# Patient Record
Sex: MALE | Race: WHITE
[De-identification: names, ages, dates, MRNs, and addresses within clinical notes are randomized per-mention and may not be internally consistent; named-entity substitution may affect disease eponyms.]

---

## 2020-02-14 LAB
ANION GAP SERPL CALC-SCNC: 13.2 MMOL/L (ref 8–16)
BASOPHILS # BLD AUTO: 0 10*3/UL (ref 0–0.1)
BASOPHILS NFR BLD AUTO: 0.3 % (ref 0–1)
BUN SERPL-MCNC: 20 MG/DL (ref 7–26)
BUN/CREAT SERPL: 13 (ref 6–25)
CALCIUM SERPL-MCNC: 8.8 MG/DL (ref 8.4–10.2)
CHLORIDE SERPL-SCNC: 104 MMOL/L (ref 98–107)
CO2 SERPL-SCNC: 23 MMOL/L (ref 22–29)
DEPRECATED NEUTROPHILS # BLD AUTO: 4.2 10*3/UL (ref 2.1–6.9)
EGFRCR SERPLBLD CKD-EPI 2021: 43 ML/MIN (ref 60–?)
EOSINOPHIL # BLD AUTO: 0.1 10*3/UL (ref 0–0.4)
EOSINOPHIL NFR BLD AUTO: 1.5 % (ref 0–6)
ERYTHROCYTE [DISTWIDTH] IN CORD BLOOD: 13.2 % (ref 11.7–14.4)
GLUCOSE SERPLBLD-MCNC: 236 MG/DL (ref 74–118)
HCT VFR BLD AUTO: 42.7 % (ref 38.2–49.6)
HGB BLD-MCNC: 13.5 G/DL (ref 14–18)
LYMPHOCYTES # BLD: 2.2 10*3/UL (ref 1–3.2)
LYMPHOCYTES NFR BLD AUTO: 31 % (ref 18–39.1)
MCH RBC QN AUTO: 29.9 PG (ref 28–32)
MCHC RBC AUTO-ENTMCNC: 31.6 G/DL (ref 31–35)
MCV RBC AUTO: 94.7 FL (ref 81–99)
MONOCYTES # BLD AUTO: 0.6 10*3/UL (ref 0.2–0.8)
MONOCYTES NFR BLD AUTO: 8.6 % (ref 4.4–11.3)
NEUTS SEG NFR BLD AUTO: 58.5 % (ref 38.7–80)
PLATELET # BLD AUTO: 307 X10E3/UL (ref 140–360)
POTASSIUM SERPL-SCNC: 4.2 MMOL/L (ref 3.5–5.1)
RBC # BLD AUTO: 4.51 X10E6/UL (ref 4.3–5.7)
SODIUM SERPL-SCNC: 136 MMOL/L (ref 136–145)

## 2020-02-18 ENCOUNTER — HOSPITAL ENCOUNTER (OUTPATIENT)
Dept: HOSPITAL 88 - OR | Age: 75
Discharge: HOME | End: 2020-02-18
Attending: INTERNAL MEDICINE
Payer: MEDICARE

## 2020-02-18 VITALS — SYSTOLIC BLOOD PRESSURE: 102 MMHG | DIASTOLIC BLOOD PRESSURE: 55 MMHG

## 2020-02-18 DIAGNOSIS — K29.50: ICD-10-CM

## 2020-02-18 DIAGNOSIS — Z79.82: ICD-10-CM

## 2020-02-18 DIAGNOSIS — Z01.812: ICD-10-CM

## 2020-02-18 DIAGNOSIS — D12.0: ICD-10-CM

## 2020-02-18 DIAGNOSIS — I25.810: ICD-10-CM

## 2020-02-18 DIAGNOSIS — K64.8: ICD-10-CM

## 2020-02-18 DIAGNOSIS — K31.7: ICD-10-CM

## 2020-02-18 DIAGNOSIS — K44.9: ICD-10-CM

## 2020-02-18 DIAGNOSIS — Z01.810: ICD-10-CM

## 2020-02-18 DIAGNOSIS — Z79.02: ICD-10-CM

## 2020-02-18 DIAGNOSIS — E78.5: ICD-10-CM

## 2020-02-18 DIAGNOSIS — K31.89: ICD-10-CM

## 2020-02-18 DIAGNOSIS — Z79.84: ICD-10-CM

## 2020-02-18 DIAGNOSIS — E11.9: ICD-10-CM

## 2020-02-18 DIAGNOSIS — Z95.5: ICD-10-CM

## 2020-02-18 DIAGNOSIS — Z79.4: ICD-10-CM

## 2020-02-18 DIAGNOSIS — K52.9: Primary | ICD-10-CM

## 2020-02-18 DIAGNOSIS — Z95.1: ICD-10-CM

## 2020-02-18 DIAGNOSIS — I10: ICD-10-CM

## 2020-02-18 PROCEDURE — 80048 BASIC METABOLIC PNL TOTAL CA: CPT

## 2020-02-18 PROCEDURE — 85025 COMPLETE CBC W/AUTO DIFF WBC: CPT

## 2020-02-18 PROCEDURE — 45384 COLONOSCOPY W/LESION REMOVAL: CPT

## 2020-02-18 PROCEDURE — 93005 ELECTROCARDIOGRAM TRACING: CPT

## 2020-02-18 PROCEDURE — 43239 EGD BIOPSY SINGLE/MULTIPLE: CPT

## 2020-02-18 PROCEDURE — 45378 DIAGNOSTIC COLONOSCOPY: CPT

## 2020-02-18 PROCEDURE — 45380 COLONOSCOPY AND BIOPSY: CPT

## 2020-02-18 PROCEDURE — 36415 COLL VENOUS BLD VENIPUNCTURE: CPT

## 2020-02-18 NOTE — XMS REPORT
Encounter Summary

                             Created on: 2019



Prince Shelley

External Reference #: [id number]

: 1945

Sex: Male



Demographics







                          Address                   [address] [place] [address]  [address]

 

                          Home Phone                [phone]

 

                          Preferred Language        English

 

                          Marital Status            Never 

 

                          Rastafari Affiliation     Unknown

 

                          Race                      White

 

                          Ethnic Group              Non-





Author







                          Organization              Unknown

 

                          Address                   [address] [place] [address]  [address]



 

                          Phone                     [phone]







Care Team Providers







                    Care Team Member Name    Role                Phone

 

                    Dr. Mitchell Rangel    [phone]                   [phone]



                                                      



Reason for Visit

                      





                                        hyperlipidemia; Annual Depression Screening; AWV Annual Wellness Visit Male (VFP);

 hypertension; diabetes                



                                                                              



Instructions

          





                                        1. Adult health examination

 

                                        2. Depression screening

 

                                         learning about depression

 

                                        3. Obesity

 

                                         learning about healthy weight

 

                                        4. Hyperlipidemia

 

                                         high cholesterol: care instructions

 

                                         CMP, serum or plasma

 

                                         lipid panel, serum

 

                                        5. Hypertensive disorder

 

                                         CBC w/ auto diff

 

                                        6. Screening for malignant neoplasm of prostate

 

                                         PSA, serum or plasma

 

                                        7. Screening for disorder

 

                                         hepatitis C virus RNA, quant, PCR, serum or plasma

 

                                        8. Senile purpura

 

                                        9. Angina pectoris

 

                                        10. Congestive heart failure

 

                                        11. Stented coronary artery

 

                                        12. Type 2 diabetes mellitus

 

                                         HbA1c (hemoglobin A1c), blood

 

                                         microalbumin:creatinine ratio, urine







Discussion Note: None recorded.                                                 
                                                          



Plan of Care

                      





                Reminders                                                                    Provider 

               

 

                Appointments    Est Patient     2019 8:15AM    Mitchell Browning MD

 

                Lab             CMP, Serum or Plasma    2019      [facility]

 

                               Lipid Panel, Serum    2019      [facility]

 

                               HbA1C (Hemoglobin a1C), Blood    2019      [facility]



 

                               Microalbumin:creatinine Ratio, Urine    2019      [facility]



 

                               PSA, Serum or Plasma    2019      [facility]

 

                               CBC W/ Auto Diff    2019      [facility]

 

                               Hepatitis C Virus RNA, Quant, PCR, Serum or Plasma    2019      [facility]

 

                Referral        None recorded.                   

 

                Procedures      None recorded.                   

 

                Surgeries       None recorded.                   

 

                Imaging         None recorded.                   



                                                                                
                                                                             



Medications

                      





                Name                      Start Date                                                

            

 

                                        aspirin 81 mg tablet,delayed release

 Take 1 tablet every day by oral route.    

 

                                        atorvastatin 80 mg tablet

 Take 1 tablet every day by oral route.    

 

                                        BD Ultra-Fine Short Pen Needle 31 gauge x 5/16"

 use once daily                         

 

                                        carvedilol 12.5 mg tablet

 Take 1 tablet twice a day by oral route.    

 

                                        clopidogrel 75 mg tablet

 Take 1 tablet every day by oral route.    

 

                                        doxycycline hyclate 50 mg capsule

 Take 1 capsule twice a day by oral route.    

 

                                        fenofibrate nanocrystallized 145 mg tablet

 Take 1 tablet every day by oral route.    

 

                                        furosemide 20 mg tablet

 Take 1 tablet every day by oral route.    

 

                                        glipizide 10 mg tablet

 Take 1 tablet twice a day by oral route.    

 

                                        ibuprofen 600 mg tablet

 Take 1 tablet 3 times a day by oral route as needed.    

 

                                        isosorbide mononitrate ER 30 mg tablet,extended release 24 hr

 Take 1 tablet every day by oral route.    

 

                                        Levemir FlexTouch U-100 Insulin 100 unit/mL (3 mL) subcutaneous pen

 Inject 35 units every day by sub-q route at bedtime.    

 

                                        lisinopril 20 mg tablet

 Take 1 tablet twice a day by oral route.    

 

                                        metformin 500 mg tablet

 Take 2 tablets twice a day by oral route.    

 

                                        nitroglycerin 0.4 mg sublingual tablet

 PLACE 1 TABLET (0.4 MG) BY SUBLINGUAL ROUTE EVERY 5 MINUTES AS NEEDEDFOR CHEST 
PAIN. DO NOT EXCEED 3 DOSES IN 15 MINUTES.    

 

                                        OneTouch Ultra Blue Test Strip

 use three times daily                  

 

                                        OneTouch Ultra2 Meter

 use as directed                        



                                                                                
                                                                                
                                                                                
     



Medications Administered

          



  None recorded.                                                                
               



Vitals

          





                Height          Weight          BMI             Blood Pressure 

 

                 5 ft 8 in        206.6 lbs        31.4 kg/m2        124/70 mm[Hg]  



                                                                              



Lab Results

                      



              None recorded.                                                    
                                                



Allergies

          





          Code      Code System    Name      Reaction    Severity    Status    Onset

 

                              Penicillins    Other     Moderate    Active    

 

          013559    RxNorm    Vicodin    Other     Moderate    Active    



                                                                                
        



Problems

                      





                Name                      Status                      Onset Date                      

Source                                                  

 

                Myocardial Infarction    Active          2019      

 

                Congestive Heart Failure    Active          2019      

 

                Stented Coronary Artery    Active          2019      

 

                Coronary Artery Bypass Graft    Active          2019      

 

                Type 2 Diabetes Mellitus    Active                         

 

                Hyperlipidemia    Active                         

 

                Hypertensive Disorder    Active                         



                                                                                
                                                                    



Procedures

                      





                Date                      Name                      Performed by                      

                

 

                    2011          Cardiac Surgery     Information not available



                                                                                
        



Vaccine List

          



None recorded.                                                                  
            



Social History

          





                    Tobacco Smoking Status    Never Smoker         



                                                                              



Past Encounters

                      





                                        2019

Adult Health Examination; Depression Screening; Obesity; Hyperlipidemia; 
Hypertensive Disorder; Screening for Malignant Neoplasm of Prostate; Screening 
for Disorder; Senile Purpura; Angina Pectoris; Congestive Heart Failure; Stented
 Coronary Artery; Type 2 Diabetes Mellitus

Mitchell Browning MD: [address] [place] [address], Ph. 
[phone]



                                                                                
        



History of Present Illness

          





                                                   Mini Cog

 

                          Reported By:              Patient

 

                          Functional Ability:       Personal/Social/ Draw a clock and write in the numbers in the

 correct place, and set the time to 10 minutes after 11 o'clock was completed 
correctly? Yes, 3 word recall: Your nurse or doctor will ask you to remember 3 
words.  In 5 minutes, they will ask you to repeat them.   Patient recalled 3 
words









                                                   Opioid Use Assessment

 

                          Reported By:              Patient

 

                          Opioid Use Assessment::    Current Use of Opioids : no use of opioids (no further 

questions required)







Note:F/u on dm: glucose readings at home 60s-90s fasting.<div>F/u on HTN: BPs at
 home: 140s/80s.</div><div>F/u on CHF, CAD s/p stent, angina:  SOB with 
activity. Denies LE edema, chest pain, palpitations or lightheadedness.</div>   
                                                                 



Review of Systems

                      





                                                   Comprehensive General Adult ROS

 

                          Reported By:              Patient

 

                          Constitutional:           Constitutional: no fever

 

                          Eyes:                     Eyes: no vision change

 

                          Cardiovascular:           Cardiovascular: no chest pain, no palpitations, no lightheadedness



 

                          Respiratory:              Respiratory: no cough, no wheezing, shortness of breath

 

                          Gastrointestinal:         Gastrointestinal: no abdominal pain, no nausea, no vomiting, 

no constipation, no diarrhea

 

                          Musculoskeletal:          Musculoskeletal: no muscle aches, no swelling in the extremities



 

                          Neurologic:               Neurologic: no loss of consciousness, no headaches

 

                          Psychiatric:              Psych: no depression, no alcohol abuse, no anxiety, no suicidal thoughts



 

                          Endocrine:                Endocrine: no fatigue



                                                                              



Physical Exam

                      





                                                   General Adult Exam (male)

 

                          Reported By:              Patient

 

                          Constitutional:           General Appearance: healthy-appearing, obese. Level of Distress:

 NAD. Ambulation: ambulating normally

 

                          Psychiatric:              Insight: good judgement. Mental Status: active and alert, normal mood,

 normal affect. Orientation: to time, to place, to person. Memory: recent memory
 normal, remote memory normal

 

                          Eyes:                     Lids and Conjunctivae: non-injected, no discharge

 

                          ENMT:                     Ears: TMs clear. Nose: no sinus tenderness. Lips, Teeth, and Gums: no mouth

 or lip ulcers. Oropharynx: moist mucous membranes

 

                          Neck:                     Neck: supple, trachea midline. Thyroid: no enlargement, non-tender

 

                          Lungs:                    Auscultation: breath sounds normal

 

                          Cardiovascular:           Heart Auscultation: RRR, normal S1, normal S2, no murmurs. Neck

 vessels: no carotid bruits

 

                          Abdomen:                  Inspection and Palpation: soft, non-distended, no tenderness, no guarding



 

                          Musculoskeletal::         Motor Strength and Tone: normal, normal tone. Joints, Bones, 

and Muscles: normal movement of all extremities. Extremities: no edema

 

                          Neurologic:               Gait and Station: normal gait

 

                          Skin:                     Inspection and palpation: no rash, no lesions

## 2020-02-18 NOTE — XMS REPORT
Patient Summary Document

                             Created on: 2020



ANUEL JEAN BAPTISTE

External Reference #: [id number]

: 1945

Sex: Male



Demographics







                          Address                   [address]

[place]  [address]

 

                          Home Phone                [phone]

 

                          Preferred Language        Unknown

 

                          Marital Status            Unknown

 

                          Restoration Affiliation     Unknown

 

                          Race                      Unknown

 

                          Ethnic Group              Unknown





Author







                          Author                    [facility]

 

                          Address                   Unknown

 

                          Phone                     Unavailable







Care Team Providers







                    Care Team Member Name    Role                Phone

 

                          Unavailable               Unavailable







Problems

This patient has no known problems.



Allergies, Adverse Reactions, Alerts

This patient has no known allergies or adverse reactions.



Medications

This patient has no known medications.



Encounters







             Start Date/Time    End Date/Time    Encounter Type    Admission Type    Attending Clinicians

                    Care Facility       Care Department     Encounter ID

 

        2020 08:35:00    2020 08:35:00    Outpatient                    [facility]    MED     [id number]

## 2020-02-18 NOTE — XMS REPORT
Encounter Summary

                             Created on: 2019



Prince Shelley

External Reference #: [id number]

: 1945

Sex: Male



Demographics







                          Address                   [address]

[place]  [address]

 

                          Home Phone                [phone]

 

                          Preferred Language        English

 

                          Marital Status            Never 

 

                          Religion Affiliation     Unknown

 

                          Race                      White

 

                          Ethnic Group              Non-





Author







                          Organization              Unknown

 

                          Address                   [address] [place]  [address]



 

                          Phone                     [phone]







Care Team Providers







                    Care Team Member Name    Role                Phone

 

                    Dr. Mitchell Rangel    [phone]                   [phone]

 

                    Rony Umana MD    210                 [phone]



                                                      



Reason for Visit

          





                                        Congestive heart failure; Hyperlipidemia; Angina pectoris; Hypertensive disorder;

 Type 2 diabetes mellitus; Stented coronary artery



                                                                    



Instructions

          





                                        1. Hypertensive disorder

 

                                        2. Hyperlipidemia

 

                                         high cholesterol: care instructions

 

                                        3. Type 2 diabetes mellitus

 

                                        4. Angina pectoris

 

                                        5. Congestive heart failure

 

                                        6. Stented coronary artery

 

                                        7. Screening for malignant neoplasm of colon

 

                                         fecal occult blood, stool

 

                                        8. Peripheral vascular disease

 

                                        9. Allergic rhinitis

 

                                         fluticasone propionate 50 mcg/actuation nasal spray,suspension

 

                                         montelukast 10 mg tablet







Discussion Note: None recorded.                                                 
                                      



Plan of Care

                      





                Reminders                                                                    Provider 

               

 

                Appointments    Return to Office    on or around 02/15/2020    Mitchell Browning MD

 

                Lab             Fecal Occult Blood, Stool    11/15/2019      [facility]

 

                Referral        None recorded.                   

 

                Procedures      None recorded.                   

 

                Surgeries       None recorded.                   

 

                Imaging         None recorded.                   



                                                                                
                 



Medications

                      





                Name                      Start Date                                                

            

 

                                        aspirin 81 mg tablet,delayed release

 Take 1 tablet every day by oral route.    

 

                                        atorvastatin 80 mg tablet

 Take 1 tablet every day by oral route.    

 

                                        BD Ultra-Fine Short Pen Needle 31 gauge x 5/16"

 use once daily                         

 

                                        carvedilol 12.5 mg tablet

 Take 1 tablet twice a day by oral route.    

 

                                        clopidogrel 75 mg tablet

 Take 1 tablet every day by oral route.    

 

                                        doxycycline hyclate 50 mg capsule

 Take 1 capsule twice a day by oral route.    

 

                                        fenofibrate nanocrystallized 145 mg tablet

 Take 1 tablet every day by oral route.    

 

                                        fluticasone propionate 50 mcg/actuation nasal spray,suspension

 Spray 1 spray twice a day by intranasal route as directed for 30 days.    

 

                                        furosemide 20 mg tablet

 Take 1 tablet every day by oral route.    

 

                                        glipizide 10 mg tablet

 Take 1 tablet twice a day by oral route.    

 

                                        iron

 3 TIMES A DAY - OTC                    

 

                                        isosorbide mononitrate ER 30 mg tablet,extended release 24 hr

 Take 1 tablet every day by oral route.    

 

                                        Levemir FlexTouch U-100 Insulin 100 unit/mL (3 mL) subcutaneous pen

 Inject 35 units every day by sub-q route at bedtime.    

 

                                        lisinopril 20 mg tablet

 Take 1 tablet twice a day by oral route for 90 days.    

 

                                        metformin 500 mg tablet

 Take 2 tablets twice a day by oral route.    

 

                                        montelukast 10 mg tablet

 TAKE 1 TABLET BY MOUTH EVERYDAY AT BEDTIME    

 

                                        nitroglycerin 0.4 mg sublingual tablet

 DISSOLVE 1 TABLET ON TONGUE AND SWALLOW EVERY 5 MINUTES AS NEEDED FOR CHEST 
PAIN                                    

 

                                        OneTouch Ultra Blue Test Strip

 Take 1 strip twice a day by miscell. route.    



                                                                                
                                                                                
                                                                                
               



Medications Administered

          



  None recorded.                                                                
               



Vitals

          





                Height          Weight          BMI             Blood Pressure 

 

                 5 ft 8 in        209 lbs         31.8 kg/m2        120/60 mm[Hg]  



                                                                              



Results

                          





                                        Lab Results

 

                                         



                



None recorded.                                                                  
            



Allergies

          





          Code      Code System    Name      Reaction    Severity    Status    Onset

 

                              Penicillins    Other     Moderate    Active    

 

          601211    RxNorm    Vicodin    Other     Moderate    Active    



                                                                                
        



Problems

                      





                Name                      Status                      Onset Date                      

Source                                                  

 

                Myocardial Infarction    Active          2019      

 

                Congestive Heart Failure    Active          2019      

 

                Stented Coronary Artery    Active          2019      

 

                Coronary Artery Bypass Graft    Active          2019      

 

                Senile Purpura    Active          2019      

 

                Angina Pectoris    Active          2019      

 

                Type 2 Diabetes Mellitus    Active                         

 

                Hyperlipidemia    Active                         

 

                Hypertensive Disorder    Active                         



                                                                                
                                                                                
        



Procedures

                      





                Date                      Name                      Performed by                      

                

 

                    2011          Cardiac Surgery     Information not available



                                                                                
        



Vaccine List

          





                                        Vaccine Type

 

                                        influenza, injectable, quadrivalent

 

                                        2019

 

                                        pneumococcal conjugate PCV 13

 

                                        2019

 

                                        pneumococcal polysaccharide PPV23

 

                                        2013

 

                                        Tdap

 

                                        2017



                                                                                
                            



Social History

          





                    Tobacco Smoking Status    Never Smoker         



                                                                              



Past Encounters

                      





                                        11/15/2019

Hypertensive Disorder; Hyperlipidemia; Type 2 Diabetes Mellitus; Angina 
Pectoris; Congestive Heart Failure; Stented Coronary Artery; Screening for 
Malignant Neoplasm of Colon; Peripheral Vascular Disease; Allergic Rhinitis

Mitchell Browning MD: [address] [place] [address], Ph. 
[phone]



                                                                                
        



History of Present Illness

          



Note:F/u on chronic conditions. Compliant with meds. Non compliant with diet or 
exercise. BPs at home 120s/80s. Glucose readings at home 80s fasting. No side 
effects with meds. <div>Persistent runny nose on and off since a few months ago.
 Not better with antihistamines. Denies ear pain, sore throat, cough, sob, 
wheezing or chest pain.</div>                                                   
                 



Review of Systems

                      





                                                   Comprehensive General Adult ROS

 

                          Reported By:              Patient

 

                          Constitutional:           Constitutional: no fever

 

                          Eyes:                     Eyes: no vision change

 

                          Cardiovascular:           Cardiovascular: no chest pain, no palpitations, no lightheadedness



 

                          Respiratory:              Respiratory: no cough, no wheezing

 

                          Gastrointestinal:         Gastrointestinal: no abdominal pain, no nausea, no vomiting, 

no constipation, no diarrhea

 

                          Musculoskeletal:          Musculoskeletal: no muscle aches, no swelling in the extremities



 

                          Neurologic:               Neurologic: no loss of consciousness, no headaches

 

                          Psychiatric:              Psych: no depression, no alcohol abuse, no anxiety, no suicidal thoughts



 

                          Endocrine:                Endocrine: no fatigue



                                                                              



Physical Exam

                      





                                                   General Adult Exam (male)

 

                          Reported By:              Patient

 

                          Constitutional:           General Appearance: healthy-appearing, obese. Level of Distress:

 NAD. Ambulation: ambulating normally

 

                          Psychiatric:              Insight: good judgement. Mental Status: active and alert, normal mood,

 normal affect. Orientation: to time, to place, to person. Memory: recent memory
 normal, remote memory normal

 

                          Eyes:                     Lids and Conjunctivae: non-injected, no discharge

 

                          ENMT:                     Ears: TMs clear. Nose: no sinus tenderness, nares non-patent, nasal discharge,

 post nasal drip. Lips, Teeth, and Gums: no mouth or lip ulcers. Oropharynx: 
moist mucous membranes, erythema

 

                          Neck:                     Neck: supple, trachea midline. Lymph Nodes: no cervical LAD. Thyroid: no 

enlargement, non-tender

 

                          Lungs:                    Respiratory effort: no dyspnea. Auscultation: breath sounds normal

 

                          Cardiovascular:           Heart Auscultation: RRR, normal S1, normal S2, no murmurs. Neck

 vessels: no carotid bruits

 

                          Musculoskeletal::         Motor Strength and Tone: normal, normal tone. Joints, Bones, 

and Muscles: normal movement of all extremities. Extremities: no edema

 

                          Neurologic:               Gait and Station: normal gait

## 2020-02-18 NOTE — XMS REPORT
Encounter Summary

                             Created on: 01/10/2020



Prince Shelley

External Reference #: [id number]

: 1945

Sex: Male



Demographics







                          Address                   [address]

[place]  [address]

 

                          Home Phone                [phone]

 

                          Preferred Language        English

 

                          Marital Status            Never 

 

                          Restorationism Affiliation     Unknown

 

                          Race                      White

 

                          Ethnic Group              Non-





Author







                          Organization              Unknown

 

                          Address                   [address] [place] [address]  [id number]



 

                          Phone                     [phone]







Care Team Providers







                    Care Team Member Name    Role                Phone

 

                    Dr. Mitchell Rangel    [phone]                   [phone]

 

                    Rony Umana MD    210                 [phone]



                                                      



Reason for Visit

                      





                                        Hyperlipidemia; Hypertensive disorder; Type 2 diabetes mellitus; other - see typed

 reason                



                                                                              



Instructions

          





                                        1. Melena

 

                                         colonoscopy referral

 

                                         lower gastrointestinal bleeding: care instructions

 

                                         CBC w/ auto diff

 

                                        2. Body mass index 30+ - obesity

 

                                         body mass index: care instructions

 

                                         learning about healthy weight

 

                                        3. Type 2 diabetes mellitus

 

                                         metformin 500 mg tablet

 

                                        4. Hyperlipidemia

 

                                         high cholesterol: care instructions

 

                                        5. Hypertensive disorder

 

                                        6. Rosacea

 

                                         doxycycline hyclate 50 mg capsule







Discussion Note: None recorded.                                                 
                                                                    



Plan of Care

                      





                Reminders                                                                    Provider 

               

 

                Appointments    Return to Office    on or around 02/15/2020    Mitchell Browning MD

 

                Lab             CBC W/ Auto Diff    01/10/2020      [facility]

 

                Referral        Colonoscopy Referral    01/10/2020      Emmett Jiménez MD

 

                Procedures      None recorded.                   

 

                Surgeries       None recorded.                   

 

                Imaging         None recorded.                   



                                                                                
                           



Medications

                      





                Name                      Start Date                                                

            

 

                                        aspirin 81 mg tablet,delayed release

 Take 1 tablet every day by oral route.    

 

                                        atorvastatin 80 mg tablet

 Take 1 tablet every day by oral route.    

 

                                        BD Ultra-Fine Short Pen Needle 31 gauge x 5/16"

 use once daily                         

 

                                        carvedilol 12.5 mg tablet

 TAKE 1 TABLET BY MOUTH TWICE A DAY     

 

                                        clopidogrel 75 mg tablet

 Take 1 tablet every day by oral route for 90 days.    

 

                                        doxycycline hyclate 50 mg capsule

 Take 1 capsule twice a day by oral route for 90 days.    

 

                                        fenofibrate nanocrystallized 145 mg tablet

 Take 1 tablet every day by oral route.    

 

                                        fluticasone propionate 50 mcg/actuation nasal spray,suspension

 SPRAY 1 SPRAY(S) TWICE A DAY BY INTRANASAL ROUTE AS DIRECTED FOR 30 DAYS.    

 

                                        furosemide 20 mg tablet

 Take 1 tablet every day by oral route for 90 days.    

 

                                        glipizide 10 mg tablet

 Take 1 tablet twice a day by oral route for 90 days.    

 

                                        iron

 3 TIMES A DAY - OTC                    

 

                                        isosorbide mononitrate ER 30 mg tablet,extended release 24 hr

 Take 1 tablet every day by oral route.    

 

                                        Levemir FlexTouch U-100 Insulin 100 unit/mL (3 mL) subcutaneous pen

 INJECT 37 UNIT(S) EVERY DAY BY SUB-Q ROUTE AT BEDTIME.    

 

                                        lisinopril 20 mg tablet

 Take 1 tablet twice a day by oral route for 90 days.    

 

                                        metformin 500 mg tablet

 Take 2 tablets twice a day by oral route for 90 days.    

 

                                        montelukast 10 mg tablet

 TAKE 1 TABLET BY MOUTH EVERYDAY AT BEDTIME    

 

                                        nitroglycerin 0.4 mg sublingual tablet

 DISSOLVE 1 TABLET ON TONGUE AND SWALLOW EVERY 5 MINUTES AS NEEDED FOR CHEST 
PAIN                                    

 

                                        OneTouch Ultra Blue Test Strip

 Take 1 strip twice a day by miscell. route.    



                                                                                
                                                                                
                                                                                
               



Medications Administered

          



  None recorded.                                                                
               



Vitals

          





                Height          Weight          BMI             Blood Pressure 

 

                 5 ft 8 in        208 lbs         31.6 kg/m2        137/80 mm[Hg]  



                                                                              



Results

                          





                                        Lab Results

 

                                         



                



None recorded.                                                                  
            



Allergies

          





          Code      Code System    Name      Reaction    Severity    Status    Onset

 

                              Penicillins    Other     Moderate    Active    

 

          841275    RxNorm    Vicodin    Other     Moderate    Active    



                                                                                
        



Problems

                      





                Name                      Status                      Onset Date                      

Source                                                  

 

                Myocardial Infarction    Active          2019      

 

                Congestive Heart Failure    Active          2019      

 

                Stented Coronary Artery    Active          2019      

 

                Coronary Artery Bypass Graft    Active          2019      

 

                Senile Purpura    Active          2019      

 

                Angina Pectoris    Active          2019      

 

                Type 2 Diabetes Mellitus    Active                         

 

                Hyperlipidemia    Active                         

 

                Hypertensive Disorder    Active                         



                                                                                
                                                                                
        



Procedures

                      





                Date                      Name                      Performed by                      

                

 

                    2011          Cardiac Surgery     Information not available



                                                                                
        



Vaccine List

          





                                        Vaccine Type

 

                                        influenza, injectable, quadrivalent

 

                                        2019

 

                                        pneumococcal conjugate PCV 13

 

                                        2019

 

                                        pneumococcal polysaccharide PPV23

 

                                        2013

 

                                        Tdap

 

                                        2017



                                                                                
                            



Social History

          





                    Tobacco Smoking Status    Never Smoker         



                                                                              



Past Encounters

                      





                                        01/10/2020

Melena; Body Mass Index 30+ - Obesity; Type 2 Diabetes Mellitus; Hyperlipidemia;
 Hypertensive Disorder; Rosacea

Pam Jimenez MD: [address] [place] [address], Ph. [phone]



                                                                                
        



History of Present Illness

          



Note:73yo male with hx of CAD, CHF, MI, DM, HTN, HDL, rosacea presents for fecal
 incontinence for past 10days. On both Plavix &amp; ASA. Went to ER in [place], [place], last Friday 1/3/20 &amp; had CT abd/pelvis - no blockages seen. Had prior CT
 in [place] in 2019 for kidney stone - passed spontaneously. Pt was still 
experiencing freq daily leakage of stool, so went to [facility] on Sun 20 in [place] 
and got a shot to slow down bowel movements - helped some. Frequency decreased 
from every 5min to every 10min with clear discharge, no blood, often with no 
stool. Occurs all day &amp; all night. Pt feels like he needs to have BM, but 
nothing comes out.<div>Has decreased appetite. Not taking Pepto-Bismol or any 
OTC medicine.</div><div>No fever. No abdominal pain. No cramping.</div><div>No 
hx of prostate problems or urinary incontinence.</div><div>Foul-smelling, 
(melena) dark-black stool. (+) FOBT on 19.</div><div>[facility] in 
 [facility]. No polyps seen, per pt.</div><div>Hx of hemorrhoids in past, not
 currently.</div><div>Only new medicine since November is iron supplement per 
hematology for borderline iron-def anemia.</div><div>Pt with PMHx of CAD with 
two stents followed by cardiology at [facility]. On both ASA 81mg qd &amp; Plavix 75mg 
qd.</div><div>No angina or chest pain. No recent CHF exacerbation.</div><div>
Will arrange stat GI referral for colonoscopy.</div>



Review of Systems:ROS as noted in the HPI                                       
                             



Review of Systems

                      





                                                   Comprehensive General Adult ROS

 

                          Reported By:              Patient

 

                          Constitutional:           Constitutional: no fever

 

                          Eyes:                     Eyes: no vision change

 

                          Cardiovascular:           Cardiovascular: no chest pain, no palpitations, no lightheadedness



 

                          Respiratory:              Respiratory: no cough, no wheezing

 

                          Gastrointestinal:         Gastrointestinal: no abdominal pain, no nausea, no vomiting, 

no constipation, no diarrhea, not vomiting blood, no dyspepsia, no GERD, change 
in appetite, black or tarry stools

 

                          Musculoskeletal:          Musculoskeletal: no muscle aches, no swelling in the extremities



 

                          Neurologic:               Neurologic: no loss of consciousness, no headaches

 

                          Psychiatric:              Psych: no depression, no alcohol abuse, no anxiety, no suicidal thoughts



 

                          Endocrine:                Endocrine: no fatigue



                                                                              



Physical Exam

                      





                                                   General Adult Exam (male)

 

                          Reported By:              Patient

 

                          Constitutional:           General Appearance: healthy-appearing, obese. Level of Distress:

 NAD. Ambulation: ambulating normally

 

                          Psychiatric:              Insight: good judgement. Mental Status: active and alert, normal mood,

 normal affect. Orientation: to time, to place, to person. Memory: recent memory
 normal, remote memory normal

 

                          Eyes:                     Lids and Conjunctivae: non-injected, no discharge

 

                          ENMT:                     Ears: TMs clear. Nose: no sinus tenderness. Lips, Teeth, and Gums: no mouth

 or lip ulcers. Oropharynx: moist mucous membranes

 

                          Neck:                     Neck: supple, trachea midline. Lymph Nodes: no cervical LAD. Thyroid: no 

enlargement, non-tender

 

                          Lungs:                    Respiratory effort: no dyspnea. Auscultation: breath sounds normal

 

                          Cardiovascular:           Heart Auscultation: RRR, normal S1, normal S2, no murmurs. Neck

 vessels: no carotid bruits

 

                          Musculoskeletal::         Motor Strength and Tone: normal, normal tone. Joints, Bones, 

and Muscles: normal movement of all extremities. Extremities: no edema

 

                          Neurologic:               Gait and Station: normal gait

## 2020-02-18 NOTE — XMS REPORT
Encounter Summary

                             Created on: 2019



Prince Shelley

External Reference #: [id number]

: 1945

Sex: Male



Demographics







                          Address                   [address] [place] [address]  [address]

 

                          Home Phone                [phone]

 

                          Preferred Language        English

 

                          Marital Status            Never 

 

                          Restorationist Affiliation     Unknown

 

                          Race                      White

 

                          Ethnic Group              Non-





Author







                          Organization              Unknown

 

                          Address                   [address] [place] [address]  [address]



 

                          Phone                     [phone]







Care Team Providers







                    Care Team Member Name    Role                Phone

 

                    Dr. Mitchell Rangle    [phone]                   [phone]



                                                      



Reason for Visit

                      





                                        hyperlipidemia; Annual Depression Screening; AWV Annual Wellness Visit Male (VFP);

 hypertension; diabetes                



                                                                              



Instructions

          





                                        1. Adult health examination

 

                                        2. Depression screening

 

                                         learning about depression

 

                                        3. Obesity

 

                                         learning about healthy weight

 

                                        4. Hyperlipidemia

 

                                         high cholesterol: care instructions

 

                                         CMP, serum or plasma

 

                                         lipid panel, serum

 

                                        5. Hypertensive disorder

 

                                         CBC w/ auto diff

 

                                        6. Screening for malignant neoplasm of prostate

 

                                         PSA, serum or plasma

 

                                        7. Screening for disorder

 

                                         hepatitis C virus RNA, quant, PCR, serum or plasma

 

                                        8. Senile purpura

 

                                        9. Angina pectoris

 

                                        10. Congestive heart failure

 

                                        11. Stented coronary artery

 

                                        12. Type 2 diabetes mellitus

 

                                         HbA1c (hemoglobin A1c), blood

 

                                         microalbumin:creatinine ratio, urine







Discussion Note: None recorded.                                                 
                                                          



Plan of Care

                      





                Reminders                                                                    Provider 

               

 

                Appointments    Est Patient     2019 8:15AM    Mitchell Browning MD

 

                Lab             CMP, Serum or Plasma    2019      [facility]

 

                               Lipid Panel, Serum    2019      [facility]

 

                               HbA1C (Hemoglobin a1C), Blood    2019      [facility]



 

                               Microalbumin:creatinine Ratio, Urine    2019      [facility]



 

                               PSA, Serum or Plasma    2019      [facility]

 

                               CBC W/ Auto Diff    2019      [facility]

 

                               Hepatitis C Virus RNA, Quant, PCR, Serum or Plasma    2019      [facility]

 

                Referral        None recorded.                   

 

                Procedures      None recorded.                   

 

                Surgeries       None recorded.                   

 

                Imaging         None recorded.                   



                                                                                
                                                                             



Medications

                      





                Name                      Start Date                                                

            

 

                                        aspirin 81 mg tablet,delayed release

 Take 1 tablet every day by oral route.    

 

                                        atorvastatin 80 mg tablet

 Take 1 tablet every day by oral route.    

 

                                        BD Ultra-Fine Short Pen Needle 31 gauge x 5/16"

 use once daily                         

 

                                        carvedilol 12.5 mg tablet

 Take 1 tablet twice a day by oral route.    

 

                                        clopidogrel 75 mg tablet

 Take 1 tablet every day by oral route.    

 

                                        doxycycline hyclate 50 mg capsule

 Take 1 capsule twice a day by oral route.    

 

                                        fenofibrate nanocrystallized 145 mg tablet

 Take 1 tablet every day by oral route.    

 

                                        furosemide 20 mg tablet

 Take 1 tablet every day by oral route.    

 

                                        glipizide 10 mg tablet

 Take 1 tablet twice a day by oral route.    

 

                                        ibuprofen 600 mg tablet

 Take 1 tablet 3 times a day by oral route as needed.    

 

                                        isosorbide mononitrate ER 30 mg tablet,extended release 24 hr

 Take 1 tablet every day by oral route.    

 

                                        Levemir FlexTouch U-100 Insulin 100 unit/mL (3 mL) subcutaneous pen

 Inject 35 units every day by sub-q route at bedtime.    

 

                                        lisinopril 20 mg tablet

 Take 1 tablet twice a day by oral route.    

 

                                        metformin 500 mg tablet

 Take 2 tablets twice a day by oral route.    

 

                                        nitroglycerin 0.4 mg sublingual tablet

 PLACE 1 TABLET (0.4 MG) BY SUBLINGUAL ROUTE EVERY 5 MINUTES AS NEEDEDFOR CHEST 
PAIN. DO NOT EXCEED 3 DOSES IN 15 MINUTES.    

 

                                        OneTouch Ultra Blue Test Strip

 use three times daily                  

 

                                        OneTouch Ultra2 Meter

 use as directed                        



                                                                                
                                                                                
                                                                                
     



Medications Administered

          



  None recorded.                                                                
               



Vitals

          





                Height          Weight          BMI             Blood Pressure 

 

                 5 ft 8 in        206.6 lbs        31.4 kg/m2        124/70 mm[Hg]  



                                                                              



Lab Results

                      



              None recorded.                                                    
                                                



Allergies

          





          Code      Code System    Name      Reaction    Severity    Status    Onset

 

                              Penicillins    Other     Moderate    Active    

 

          563715    RxNorm    Vicodin    Other     Moderate    Active    



                                                                                
        



Problems

                      





                Name                      Status                      Onset Date                      

Source                                                  

 

                Myocardial Infarction    Active          2019      

 

                Congestive Heart Failure    Active          2019      

 

                Stented Coronary Artery    Active          2019      

 

                Coronary Artery Bypass Graft    Active          2019      

 

                Type 2 Diabetes Mellitus    Active                         

 

                Hyperlipidemia    Active                         

 

                Hypertensive Disorder    Active                         



                                                                                
                                                                    



Procedures

                      





                Date                      Name                      Performed by                      

                

 

                    2011          Cardiac Surgery     Information not available



                                                                                
        



Vaccine List

          



None recorded.                                                                  
            



Social History

          





                    Tobacco Smoking Status    Never Smoker         



                                                                              



Past Encounters

                      





                                        2019

Adult Health Examination; Depression Screening; Obesity; Hyperlipidemia; 
Hypertensive Disorder; Screening for Malignant Neoplasm of Prostate; Screening 
for Disorder; Senile Purpura; Angina Pectoris; Congestive Heart Failure; Stented
 Coronary Artery; Type 2 Diabetes Mellitus

Mitchell Browning MD: [address] [place] [address], Ph. 
[phone]



                                                                                
        



History of Present Illness

          





                                                   Mini Cog

 

                          Reported By:              Patient

 

                          Functional Ability:       Personal/Social/ Draw a clock and write in the numbers in the

 correct place, and set the time to 10 minutes after 11 o'clock was completed 
correctly? Yes, 3 word recall: Your nurse or doctor will ask you to remember 3 
words.  In 5 minutes, they will ask you to repeat them.   Patient recalled 3 
words









                                                   Opioid Use Assessment

 

                          Reported By:              Patient

 

                          Opioid Use Assessment::    Current Use of Opioids : no use of opioids (no further 

questions required)







Note:F/u on dm: glucose readings at home 60s-90s fasting.<div>F/u on HTN: BPs at
 home: 140s/80s.</div><div>F/u on CHF, CAD s/p stent, angina:  SOB with 
activity. Denies LE edema, chest pain, palpitations or lightheadedness.</div>   
                                                                 



Review of Systems

                      





                                                   Comprehensive General Adult ROS

 

                          Reported By:              Patient

 

                          Constitutional:           Constitutional: no fever

 

                          Eyes:                     Eyes: no vision change

 

                          Cardiovascular:           Cardiovascular: no chest pain, no palpitations, no lightheadedness



 

                          Respiratory:              Respiratory: no cough, no wheezing, no shortness of breath

 

                          Gastrointestinal:         Gastrointestinal: no abdominal pain, no nausea, no vomiting, 

no constipation, no diarrhea

 

                          Musculoskeletal:          Musculoskeletal: no muscle aches, no swelling in the extremities



 

                          Neurologic:               Neurologic: no loss of consciousness, no headaches

 

                          Psychiatric:              Psych: no depression, no alcohol abuse, no anxiety, no suicidal thoughts



 

                          Endocrine:                Endocrine: no fatigue



                                                                              



Physical Exam

                      





                                                   General Adult Exam (male)

 

                          Reported By:              Patient

 

                          Constitutional:           General Appearance: healthy-appearing, obese. Level of Distress:

 NAD. Ambulation: ambulating normally

 

                          Psychiatric:              Insight: good judgement. Mental Status: active and alert, normal mood,

 normal affect. Orientation: to time, to place, to person. Memory: recent memory
 normal, remote memory normal

 

                          Eyes:                     Lids and Conjunctivae: non-injected, no discharge

 

                          ENMT:                     Ears: TMs clear. Nose: no sinus tenderness. Lips, Teeth, and Gums: no mouth

 or lip ulcers. Oropharynx: moist mucous membranes

 

                          Neck:                     Neck: supple, trachea midline. Thyroid: no enlargement, non-tender

 

                          Lungs:                    Auscultation: breath sounds normal

 

                          Cardiovascular:           Heart Auscultation: RRR, normal S1, normal S2, no murmurs. Neck

 vessels: no carotid bruits

 

                          Abdomen:                  Inspection and Palpation: soft, non-distended, no tenderness, no guarding



 

                          Musculoskeletal::         Motor Strength and Tone: normal, normal tone. Joints, Bones, 

and Muscles: normal movement of all extremities. Extremities: no edema

 

                          Neurologic:               Gait and Station: normal gait

 

                          Skin:                     Inspection and palpation: no rash, no lesions

## 2020-02-18 NOTE — NUR
SPIRITUAL CARE - Pre-Surgery



Assessment:  Pt in bed. Pt reported supportive attention from family and friends. 

Intervention: I provided pastoral presence, hospitality, and sympathetic listening.  I 
acquainted pt with availability of  while hospitalized.

Outcome: Pt expressed appreciation for visit. No need for follow up indicated at this time. 



MAI Oglain

Spiritual Care Department

O: [phone]

Pager: [phone] ([phone])

## 2020-09-25 ENCOUNTER — HOSPITAL ENCOUNTER (OUTPATIENT)
Dept: HOSPITAL 88 - US | Age: 75
End: 2020-09-25
Attending: INTERNAL MEDICINE
Payer: MEDICARE

## 2020-09-25 DIAGNOSIS — N18.3: Primary | ICD-10-CM

## 2020-09-25 DIAGNOSIS — N20.0: ICD-10-CM

## 2020-09-25 PROCEDURE — 76770 US EXAM ABDO BACK WALL COMP: CPT

## 2020-09-25 PROCEDURE — 76857 US EXAM PELVIC LIMITED: CPT

## 2024-01-15 NOTE — DIAGNOSTIC IMAGING REPORT
EXAM: Renal Ultrasound

INDICATION: 

^CKD STAGE III

COMPARISON: None 

TECHNIQUE: Transverse and longitudinal images of the kidneys and bladder were

obtained.  



FINDINGS:     



Right Kidney: 

Length: 12.1 cm

Appearance: Normal echogenicity.  

Collecting system: No hydronephrosis

Stones: None

Cyst/Mass: None



Left Kidney: 

Length: 10.8 cm

Appearance: Normal echogenicity.  

Collecting system: No hydronephrosis

Stones: At the mid pole there is a 0.5 cm shadowing echogenic lesion.

Cyst/Mass: Medial anechoic cyst measuring up to 1.7 cm is noted.



Bladder: 

Mildly distended. Right ureteral jet is visualized. Left ureteral jet is not

visualized. Prostate is identified with a volume of 37 cc.



Incidentally noted diffusely increased hepatic echogenicity is noted.



IMPRESSION:



1. Negative for hydronephrosis or perinephric fluid collection.

2. Nonobstructing left interpolar 0.5 cm renal calculus.

3. Incidentally noted hepatic steatosis.



Signed by: Roger Warner MD on 9/25/2020 12:42 PM Upper Back Exercises   AMBULATORY CARE:   Upper back exercises  help heal and strengthen your back muscles and prevent another injury. Ask your healthcare provider if you need to see a physical therapist for more advanced exercises.  Seek care immediately if:   You have severe pain that prevents you from moving.      Call your doctor if:   Your pain becomes worse.    You have new pain.    You have questions or concerns about your condition, care, or exercise program.    What you need to know about exercise safety:   Do the exercises on a mat or firm surface (not on a bed).  A firm surface will support your spine and prevent upper back pain.    Move slowly and smoothly.  Avoid fast or jerky motions.    Breathe normally.  Do not hold your breath.    Stop if you feel pain.  It is normal to feel some discomfort at first, but you should not feel pain. Regular exercise will help decrease your discomfort over time.    Perform upper back exercises safely:  Ask your healthcare provider which of the following exercises are best for you and how often to do them.  Head rolls:  Sit in a chair or stand. Bring your chin toward your chest and roll your head to the right. Your ear should be over your shoulder. Hold this position for 5 seconds. Roll your head back toward your chest and to the left. Your ear should be over your left shoulder. Hold this position for 5 seconds. Next, roll your head back slowly in a clockwise [place] and repeat 3 times. Do 3 sets of head rolls.         Scapular squeeze:  Sit or stand with your arms at your sides. Squeeze your shoulder blades together and hold for 3 seconds. Relax and repeat 3 times.         Pectoralis stretch:   a doorway. Lift your hands and place them on each side of the door frame or wall slightly higher than your head. Lean forward slowly until you feel a gentle stretch. Hold for 15 seconds. Repeat 3 times, or as directed.         Cat and camel exercise:  Place your hands and  knees on the floor. Arch your back upward toward the ceiling and lower your head. Round out your spine as much as you can. Hold for 5 seconds. Lift your head upward and push your chest downward toward the floor. Hold for 5 seconds. Do 3 sets or as directed.         Bird dog:  Place your hands and knees on the floor. Keep your wrists directly below your shoulders and your knees directly below your hips. Pull your belly button in toward your spine. Do not flatten or arch your back. Tighten your abdominal muscles. Raise one arm straight out so that it is aligned with your head. Next, raise the leg opposite your arm. Hold this position for 15 seconds. Lower your arm and leg slowly and change sides. Do 5 sets.       Follow up with your doctor as directed:  Write down your questions so you remember to ask them during your visits.  © Copyright [employer] 2023 Information is for End User's use only and may not be sold, redistributed or otherwise used for commercial purposes.  The above information is an  only. It is not intended as medical advice for individual conditions or treatments. Talk to your doctor, nurse or pharmacist before following any medical regimen to see if it is safe and effective for you.